# Patient Record
Sex: MALE | Race: WHITE
[De-identification: names, ages, dates, MRNs, and addresses within clinical notes are randomized per-mention and may not be internally consistent; named-entity substitution may affect disease eponyms.]

---

## 2020-08-29 ENCOUNTER — HOSPITAL ENCOUNTER (EMERGENCY)
Dept: HOSPITAL 97 - ER | Age: 30
Discharge: HOME | End: 2020-08-29
Payer: COMMERCIAL

## 2020-08-29 DIAGNOSIS — M54.5: Primary | ICD-10-CM

## 2020-08-29 PROCEDURE — 96372 THER/PROPH/DIAG INJ SC/IM: CPT

## 2020-08-29 PROCEDURE — 99283 EMERGENCY DEPT VISIT LOW MDM: CPT

## 2020-08-29 NOTE — XMS REPORT
Clinical Summary

                           Created on:2020



Patient:Dayo Vázquez

Sex:Male

:1990

External Reference #:[id number]





Demographics







                          Address                   [address] [place] [address]

 

                          Mobile Phone              [phone]

 

                          Home Phone                [phone]

 

                          Email Address             [email or web address]

 

                          Preferred Language        English

 

                          Marital Status            Unknown

 

                          Caodaism Affiliation     Unknown

 

                          Race                      White

 

                          Ethnic Group              Not  or 









Author







                          Organization              [facility]

 

                          Address                   [address] [place] [address]









Support







                Name            Relationship    Address         Phone

 

                MIKEL VÁZQUEZ   Unavailable     Unavailable     [phone]









Care Team Providers







                    Name                Role                Phone

 

                    Unavailable         Primary Care Provider Unavailable









Allergies

No Known Allergies



Medications







          Medication Sig       Dispensed Refills   Start Date End Date  Status

 

          traZODone (DESYREL) Take 100 mg           0                           

  Active



          100 MG tablet by mouth                                          



                    nightly.                                          

 

          cetirizine (ZYRTEC) Take 10 mg by           0                         

    Active



          10 MG tablet mouth daily.                                         

 

          omeprazole Take 20 mg by           0                             Activ

e



          (PRILOSEC) 20 MG mouth daily.                                         



          capsule                                                     

 

          clonazePAM Take 1 mg by           0                             Active



          (KLONOPIN) 1 MG mouth 2 (two)                                         



          tablet    times daily                                         



                    as needed for                                         



                    Anxiety.                                          

 

          tiZANidine Take 4 mg by           0                             Active



          (ZANAFLEX) 4 MG mouth every 6                                         



          tablet    (six) hours                                         



                    as needed.                                         

 

          multivitamin per Take 1 tablet           0                            

 Active



          tablet    by mouth                                          



                    daily.                                            

 

          cefadroxil Take 1 tablet 6 tablet  0         12/10/2019 2019 Exp

ired



          (DURICEF) 1 gram (1 g total)                                         



          tablet    by mouth 2                                         



                    (two) times                                         



                    daily for 3                                         



                    days.                                             

 

          docusate sodium Take 1    10 capsule 0         12/10/2019 12/10/2019 D

iscontinued



          (COLACE) 100 MG capsule (100                                         



          capsule   mg total) by                                         



                    mouth 2 (two)                                         



                    times daily                                         



                    for 10 days.                                         

 

          docusate sodium Take 1    10 capsule 0         12/10/2019 2019 E

xpired



          (COLACE) 100 MG capsule (100                                         



          capsule   mg total) by                                         



                    mouth 2 (two)                                         



                    times daily                                         



                    for 10 days.                                         







Active Problems







                          Problem                   Noted Date

 

                          Admission for reversal of vasectomy 12/10/2019







Encounters







             Date         Type         Specialty    Care Team    Description

 

             12/10/2019   Anesthesia Event              Meryl Ren MD     

 

             12/10/2019   Surgery                   Lipshultz, Jignesh EPIDIDYMOVA

LD PADILLA MD       

 

             12/10/2019   Hospital Encounter              Jignesh Sutton MD       

 

             2019   Hospital Encounter Pre-Admission              



                                       Testing                   



after 2019



Social History







             Tobacco Use  Types        Packs/Day    Years Used   Date

 

             Former Smoker              0.5          12           Quit: 2015









                Smokeless Tobacco: Never Used                                 









                Alcohol Use     Drinks/Week     oz/Week         Comments

 

                Yes                                             Occasionally









                          Sex Assigned at Birth     Date Recorded

 

                          Not on file               









                    Job Start Date      Occupation          Industry

 

                    Not on file         Not on file         Not on file









                    Travel History      Travel Start        Travel End









                                        No recent travel history available.







Last Filed Vital Signs







                    Vital Sign          Reading             Time Taken

 

                    Blood Pressure      144/86              12/10/2019  3:25 PM 

CST

 

                    Pulse               87                  12/10/2019  3:25 PM 

CST

 

                    Temperature         36.9 C (98.4 F) 12/10/2019  3:25 PM 

CST

 

                    Respiratory Rate    20                  12/10/2019  3:25 PM 

CST

 

                    Oxygen Saturation   97%                 12/10/2019  3:25 PM 

CST

 

                    Inhaled Oxygen Concentration -                   -

 

                    Weight              97.3 kg (214 lb 9.6 oz) 12/10/2019  7:15

 AM CST

 

                    Height              181.6 cm (5' 11.5") 12/10/2019  7:15 AM 

CST

 

                    Body Mass Index     29.51               12/10/2019  7:15 AM 

CST







Plan of Treatment

Not on file



Procedures







             Procedure Name Priority     Date/Time    Associated Diagnosis Comme

nts

 

             EPIDIDYMOVASOSTOMY              12/10/2019  9:30 AM CST Disorder of

 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 



                                                    organs, unspecified 



after 2019



Results

Not on fileafter 2019



Insurance







           Payer      Benefit Plan / Group Subscriber ID Type       Phone      A

ddress

 

           [facility] CHOICE CARD AND PC3 xxxxxxxxx                  

      









           Guarantor Name Account Type Relation to Date of    Phone      Billing



                                 Patient    Birth                 Address

 

           Dayo Vázquez Personal/Family Self       1990 [id number]                                     (Home)     [place], [place]



                                                                  [address]

## 2020-08-29 NOTE — XMS REPORT
Summary of Care

                            Created on:2020



Patient:Dayo Canas

Sex:Male

:1990

External Reference #:[id number]





Demographics







                          Address                   [address] [place] [address]

 

                          Home Phone                [phone]

 

                          Mobile Phone              [phone]

 

                          Phone                     [phone]

 

                          Email Address             [email or web address]

 

                          Preferred Language        English

 

                          Marital Status            

 

                          Yazidi Affiliation     Unknown

 

                          Race                      White

 

                          Ethnic Group              Not  or 









Author







                          Organization              [facility]

 

                          Address                   [address] [place] [address]









Care Team Providers







                    Name                Role                Phone

 

                    Unavailable         Primary Care Provider Unavailable









Reason for Visit







                          Reason                    Comments

 

                          Hypogonadism              



Consult, Test &amp; Treat (Routine)





           Status     Reason     Specialty  Diagnoses / Referred By Referred To



                                            Procedures Contact    Contact

 

                Authorization Not                 Urology         Diagnoses



Hypogonadism in male                    [facility]



                                        H[place],



                Needed                                          



Procedures



[place] OFFICE OUTPATIENT VISIT 15 MINUTES



ESTABLISHED OFFICE VISIT  [address] MD







                                                                 [facility] [address]



                                        [address] [place], [place] [address]







                                                                 [address]



                                        [place], [place]



                                                       Phone:     [phone]



                                        Phone:



                                                       Fax:       [phone] Fax:



                                                                  [phone]









Encounter Details







             Date         Type         Department   Care Team    Description

 

             2020   Office Visit [facility] Henry Sutton, 

Hypogonadism



                                                            Urology



                                        MD



                                        



                                                            [address], [facility] 

[address]



                                        [address] [place] [address]

[address]



                                        [address]



                                        



                                                [phone]    [place], [place] [address]

[address]



                                        



                                                                [phone] (Fax) 







Allergies

No Known Allergiesdocumented as of this encounter (statuses as of 2020)



Medications







          Medication Sig       Dispensed Refills   Start Date End Date  Status

 

          trazodone trazodone 100 mg tablet           0                         

    Active



          (DESYREL) 100  TAKE 1 TABLET BY MOUTH EVERY DAY AT BEDTIME            

                             



          MG tablet                                                   

 

          clonazepam TK 1 T PO BID           0         10/18/2019           Acti

ve



          (KLONOPIN) 1 MG PRN                                               



          tablet                                                      

 

          diazepam  diazepam 5 mg tablet           0                            

 Active



          (VALIUM) 5 MG  TAKE 1 TABLET BY MOUTH TWICE DAILY                     

                    



          tablet                                                      

 

          omeprazole TK 1 C PO QAM           3         2019           Acti

ve



          (PRILOSEC) 20                                                   



          MG capsule                                                   

 

          cetirizine,ZYRT TK 1 T PO QD           3         2019           

Active



          EC, 10 MG                                                   



          tablet                                                      

 

          Multiple  Take 1 Tab by           0                             Active



          Vitamin   mouth.                                            



          (MULTI-VITAMINS                                                   



          ) TABS                                                      

 

          docusate sodium  mg           0                             Act

jenelle



          (DOK) 100 MG capsule                                           



          capsule                                                     

 

          tizanidine tizanidine 4 mg tablet           0                         

    Active



          (ZANAFLEX) 4 MG  TAKE 1 TABLET BY MOUTH TWICE DAILY AS NEEDED         

                                



          tablet                                                      

 

          ketorolac ketorolac 10 mg tablet           0                          

   Active



                          (TORADOL) 10 MG            Take 1 tablet every 6 hours

 by oral route as needed for 30 

days.                                                            



          tablet                                                      

 

          diclofenac diclofenac           0                    Disconti

nued



          (VOLTAREN) 75 sodium 75 mg                               0         



          MG EC tablet tablet,delayed                                         



                    release                                           

 

          hydrocodone-ace hydrocodone 5           0                    

Discontinued



          taminophen mg-acetaminophen                               0         (*

Temporary



          (NORCO) 5-325 325 mg tablet                                         pr

escription)



          mg tablet                                                   

 

          celecoxib Take 1 Cap by 30 Cap    0         2020 Disco

ntinued



          (CELEBREX) 200 mouth daily.                               0         (*

Temporary



          MG capsule                                                   prescript

ion)

 

          diazepam  diazepam 5 mg           0                    Discon

tinued



          (VALIUM) 5 MG tablet                                  0         (*Dupl

icate



          tablet                                                      medication

)



documented as of this encounter (statuses as of 2020)



Active Problems







                          Problem                   Noted Date

 

                          Admission for reversal of vasectomy 12/10/2019

 

                          Sleep apnea               2018

 

                          Anxiety                   2018

 

                          Chronic back pain         2017

 

                          Gastroesophageal reflux disease 2017



documented as of this encounter (statuses as of 2020)



Social History







             Tobacco Use  Types        Packs/Day    Years Used   Date

 

             Never Smoker                                        









                Smokeless Tobacco: Never Used                                 









                          Sex Assigned at Birth     Date Recorded

 

                          Not on file               









                    Job Start Date      Occupation          Industry

 

                    Not on file         Not on file         Not on file









                    Travel History      Travel Start        Travel End









                                        No recent travel history available.









                    COVID-19 Exposure   Response            Date Recorded

 

                    In the last month, have you been in contact with No / Unsure

         2020  2:32 PM 

CDT



                    someone who was confirmed or suspected to have              

       



                    Coronavirus / COVID-19?                     



documented as of this encounter



Last Filed Vital Signs







                Vital Sign      Reading         Time Taken      Comments

 

                Blood Pressure  108/72          2020  3:20 PM CDT 

 

                Pulse           77              2020  3:20 PM CDT 

 

                Temperature     36.9 C (98.4 F) 2020  3:20 PM CDT 

 

                Respiratory Rate -               -               

 

                Oxygen Saturation -               -               

 

                Inhaled Oxygen Concentration -               -               

 

                Weight          97.5 kg (215 lb) 2020  3:20 PM CDT 

 

                Height          180.3 cm (5' 11") 2020  3:20 PM CDT 

 

                Body Mass Index 29.99           2020  3:20 PM CDT 



documented in this encounter



Progress Notes

Jignesh Sutton MD - 2020  2:30 PM CDTFormatting of this note might be
different from the original.

I saw and evaluated the patient and agree with the resident's/fellow's findings 
as written.



30 y.o. yo male patient returns for follow-up after vasectomy reversal



Mr. Dayo Cansa is a 31 yo male who is s/p B-VV, L-TESE on 12/10/2019.  He 
presented for post-op on2020 and was experiencing moderate edema in the 
scrotum for which I prescribed celebrex.  He reports that is now since resolved,
and he reports he is feeling good, no issues with swelling, discomfort only with
certain positions and reported as tolerable without pain medication.

Partner, wife, is 28 yo, they report they have been actively trying since 
cleared at post-



Component

    Latest Ref Rng &amp; Units 3/12/2020 2020

VOLUME

    ml 2 1.50

DENSITY

    60 - 120 M/ml 21.20 (A) 23.20 (A)

MOTILITY

    60 - 80 % 8 (A) 5 (A)

FORWARD PROGRESSION

    2.5 - 3.5 2 (A) 1.5 (A)

TOTAL COUNT

    90 - 600 M 42.40 (A) 34.80 (A)

TOTAL MOTILE

    54 - 480 M 3.39 (A) 1.74 (A)

CLUMPING

    None - 1+ 1 0



SA TD: 1.5 ml / 5.2 million / 4% / 1.5





Component

    Latest Ref Rng &amp; Units 12/10/2019

WEIGHT

    mg 190.0

COUNT/HPF:(AVG OF 10 FIELDS)

     7.00

MOTILITY

    % 1

FORWARD PROGRESSION

    0-4 0.0

COMMENT

     Freeze: LEFT TESE: 2 vials x 0.5 mL as per Dr. Ernesto CASAS Celebrex

RT 3/12

Discussed IVF

Electronically signed by Jignesh Sutton MD at 2020 10:09 AM CDT
Eladio Baker MD - 2020  2:30 PM CDTFormatting of this note might be 
different from the original.

Last seen 3/2020



Follow up Patient



No chief complaint on file.



HPI:  30 y.o. yo male patient returns for follow-up after vasectomy reversal



Mr. Dayo Canas is a 31 yo male who is s/p B-VV, L-TESE on 12/10/2019.  He 
presented for post-op on2020 and was experiencing moderate edema in the 
scrotum for which I prescribed celebrex.  He reports that is now since resolved,
and he reports he is feeling good, no issues with swelling, discomfort only with
certain positions and reported as tolerable without pain medication.

Partner, wife, is 28 yo, they report they have been actively trying since 
cleared at post-operative visit.

He presents today to review semen analysis results from May and this month



He has been doing celebrex for 6 months.



SA 20: 1.5 cc/ 5.2 M/mL/4%/7.8 M TC/ TMC 0.31 M/FP 1.5



Component

    Latest Ref Rng &amp; Units 2020

VOLUME

    ml 1.50

DENSITY

    60 - 120 M/ml 23.20 (A)

MOTILITY

    60 - 80 % 5 (A)

FORWARD PROGRESSION

    2.5 - 3.5 1.5 (A)

TOTAL COUNT

    90 - 600 M 34.80 (A)

TOTAL MOTILE

    54 - 480 M 1.74 (A)

CLUMPING

    None - 1+ 0

ROUNDCELLS

    &lt;7 /hpf 1-2

VISCOSITY EXT

    None 0-4+ 0

DAYS OF ABST B/F TO SEMN ANALYSIS

     2

COLLECTION TIME

     09:00am

TIME IN LAB

     09:13am

TIME EXAMINED

     09:45am



Review of Systems

Constitutional: Negative.

HENT: Negative.

Eyes: Negative.

Respiratory: Negative.

Cardiovascular: Negative.

Gastrointestinal: Negative.

Genitourinary:

     As per HPI

Musculoskeletal: Negative.

Skin: Negative.

Neurological: Negative.  Negative for dizziness.

Endo/Heme/Allergies: Negative.

Psychiatric/Behavioral: Negative.



Major illness, prior hospitalizations, surgical procedures and current 
medications were validated and updated appropriately.



/72 (BP Location: left arm, Patient Position: Sitting, Cuff Size: large)  
| Pulse 77  | Temp 98.4 F (36.9 C)  | Ht 5' 11" (1.803 m)  | Wt 215 lb (97.5
kg)  | BMI 29.99 kg/m

Physical Exam

Constitutional: He is oriented to person, place, and time. He appears well-
developed and well-nourished. He is cooperative. No distress.

HENT:

Head: Normocephalic and atraumatic. Hair is normal.

Right Ear: Hearing and external ear normal.

Left Ear: Hearing and external ear normal.

Nose: Nose normal.

Eyes: Conjunctivae, EOM and lids are normal. Right eye exhibits no discharge. 
Left eye exhibits no discharge. No scleral icterus.

Neck: Normal range of motion. Neck supple. No neck rigidity. No edema and no 
erythema present.

Pulmonary/Chest: Effort normal. No apnea, no tachypnea and no bradypnea. No 
respiratory distress.

Musculoskeletal: Normal range of motion. He exhibits no edema or deformity.

Neurological: He is alert and oriented to person, place, and time. Coordination 
and gait normal.

Skin: Skin is warm, dry and intact. No rash noted. He is not diaphoretic. No 
erythema. No pallor.

Psychiatric: He has a normal mood and affect. His behavior is normal. Judgment 
and thought content normal.



Assessment and Plan

Diagnoses and all orders for this visit:



Infertility, male, post-vasectomy reversal



Plan

- Stop celebrex

- Consider trial of Xseed

- follow-up with Dr. Sutton in 3 months



Eladio Baker MD

Urology PGY2



Electronically signed by Eladio Baker MD at 2020 10:09 AM CDT
documented in this encounter



Plan of Treatment







             Date         Type         Specialty    Care Team    Description

 

                2020      Office Visit    Urology         Jignesh Sutton MD



                                        



                                                                [address]



                                        



                                                                [address]



                                        



                                                                [place], [place] [address]

[address]



                                        



                                                                [phone] (Fax) 









                Health Maintenance Due Date        Last Done       Comments

 

                TETANUS SHOT (ADULT) 2003                      

 

                BMI FOLLOW UP PLAN 2006                      

 

                HIV SCREENING   2006                      

 

                FLU VACCINE > 6 MONTHS 2020                      



documented as of this encounter



Procedures







             Procedure Name Priority     Date/Time    Associated Diagnosis Comme

nts

 

             FSH + LH PROFILE Routine      2020  4:26 PM Testicular failur

e Results for this



                                       CDT                       procedure are i

n



                                                                 the results



                                                                 section.



documented in this encounter



Results

FSH + LH PROFILE (2020  4:26 PM CDT)





             Component    Value        Ref Range    Performed At Pathologist



                                                                 Signature

 

             FOLLICLE     5.3          1.5 - 12.4   CPL          



             STIMULATING HORMONE              IU/L                      

 

             LUTEINIZING HORMONE 7.4          1.2 - 8.6    CPL          



                          Comment:     IU/L                      



                                                                 



                              Unless Otherwise Indicated, All Testing Pe

rformed At:                           



                              [facility], [address] [place] [address]                           



                              : LIZBET Hutchinson                           



                              CLIA Number [id number] [place]

 [place]. [id number]                           



                                                                 









                                        Specimen

 

                                        









                Performing Organization Address         City/[facility] Phone

 Number

 

                [address] [address] [phone]



TESTOSTERONE-TOTAL(URO DEPT) (2020)





             Component    Value        Ref Range    Performed At Pathologist Sig

nature

 

             TESTOSTERONE LEVEL 322          300 - 1,000 ng/dl [facility]          

 

             LABORATORY FOR MALE                           CPL          



             REPRODUCTIVE RESEARCH &                                        



             TESTING                                             

 

             CLIA# [id number]                           [facility]          

 

             DIRECTOR:GARY GOODEN,PH.D.,HCLD                                        









                                        Specimen

 

                                        Serum









                Performing Organization Address         City[facility] Phone

 Number

 

                [address] [address] [phone]



documented in this encounter



Visit Diagnoses







                                        Diagnosis

 

                                        Testicular failure - Primary







                                        Other testicular hypofunction



documented in this encounter



Insurance







          Payer     Benefit Plan Subscriber ID Effective Phone     Address   Typ

e



                    / Group             Dates                         

 

          BLUE CROSS BLUE OUT OF STATE xxxxxxxxxxxx 2019-Prese           PO 

BOX    PPO



           SHIELD     BCBS - PPO -            [id number]                    [id number]



                                        



                    [place] 



                                                            [address] 

 

           [employer]    [employer] xxxxxxxxx  Effective for [id number] [address] 

[address]



                                        Other



          HEALTHCARE                     all dates [address]        [facility]                                          [address] 









           Guarantor Name Account Type Relation to Date of Birth Phone      Bill

ing



                                 Patient                          Address

 

           Dayo Canas Personal/Family Self       1990 [phone] [address]



                                                       (Home)     [place], [place]



                                                                  [address]



documented as of this encounter

## 2020-08-29 NOTE — ER
Nurse's Notes                                                                                     

 [facility]                                                                 

Name: Dayo Canas                                                                                

Age: 30 yrs                                                                                       

Sex: Male                                                                                         

: 1990                                                                                   

MRN: [id number]                                                                                   

Arrival Date: 2020                                                                          

Time: 11:55                                                                                       

Account#: [id number]                                                                            

Bed 7                                                                                             

Private MD:                                                                                       

Diagnosis: Low back pain                                                                          

                                                                                                  

Presentation:                                                                                     

                                                                                             

12:02 Chief complaint: Patient states: hx of herniated disc, injured it on Thursday while     iw  

      mowing and fixing a sprinkler, now has low back pain L5 area, feels like pinched nerve      

      , takes ketorolac for pain, not helping. Coronavirus screen: At this time, the client       

      does not indicate any symptoms associated with coronavirus-19. Ebola Screen: Patient        

      negative for fever greater than or equal to 101.5 degrees Fahrenheit, and additional        

      compatible Ebola Virus Disease symptoms Patient denies exposure to infectious person.       

      Patient denies travel to an Ebola-affected area in the 21 days before illness onset. No     

      symptoms or risks identified at this time. Initial Sepsis Screen: Does the patient meet     

      any 2 criteria? No. Patient's initial sepsis screen is negative. Does the patient have      

      a suspected source of infection? No. Patient's initial sepsis screen is negative. Risk      

      Assessment: Do you want to hurt yourself or someone else? Patient reports no desire to      

      harm self or others. Onset of symptoms was 2020.                                 

12:02 Method Of Arrival: Ambulatory                                                           iw  

12:02 Acuity: ANNETTE 3                                                                           iw  

                                                                                                  

Historical:                                                                                       

- Allergies:                                                                                      

12:04 No Known Allergies;                                                                     iw  

- PMHx:                                                                                           

12:04 Back pain;                                                                              iw  

- PSHx:                                                                                           

12:04 Knee surgery;                                                                           iw  

                                                                                                  

- Immunization history:: Adult Immunizations not up to date.                                      

- Social history:: Smoking status: Patient denies any tobacco usage or history of.                

- Family history:: not pertinent.                                                                 

                                                                                                  

                                                                                                  

Screenin:10 Abuse screen: Denies threats or abuse. Denies injuries from another. Nutritional        sv  

      screening: No deficits noted. Tuberculosis screening: No symptoms or risk factors           

      identified. Fall Risk None identified.                                                      

                                                                                                  

Assessment:                                                                                       

12:20 General: Appears in no apparent distress. uncomfortable, Behavior is calm, cooperative. hb  

      Pain: Pain currently is 6 out of 10 on a pain scale. Neuro: Level of Consciousness is       

      awake, alert, obeys commands, Oriented to person, place, time, situation.                   

      Cardiovascular: Capillary refill < 3 seconds Patient's skin is warm and dry.                

      Respiratory: Respiratory effort is even, unlabored, Respiratory pattern is regular,         

      symmetrical. GI: No signs and/or symptoms were reported involving the gastrointestinal      

      system. : No signs and/or symptoms were reported regarding the genitourinary system.      

      EENT: No signs and/or symptoms were reported regarding the EENT system. Derm: Skin is       

      pink, warm \T\ dry. Derm: Reports low back pain.                                            

12:47 Reassessment: Pt waiting IM shot time before discharge.                                 sv  

13:05 Reassessment: Patient appears in no apparent distress at this time. No changes from     sv  

      previously documented assessment. Patient and/or family updated on plan of care and         

      expected duration. Pain level reassessed. Patient is alert, oriented x 3, equal             

      unlabored respirations, skin warm/dry/pink.                                                 

                                                                                                  

Vital Signs:                                                                                      

12:02  / 90; Pulse 64; Resp 16; Temp 98.7; Pulse Ox 99% on R/A; Weight 99.79 kg; Height iw  

      5 ft. 11 in. (180.34 cm); Pain 6/10;                                                        

12:02 Body Mass Index 30.68 (99.79 kg, 180.34 cm)                                             iw  

                                                                                                  

ED Course:                                                                                        

11:55 Patient arrived in ED.                                                                  ds1 

12:04 Triage completed.                                                                       iw  

12:10 Ragini Hewitt, RN is Primary Nurse.                                                  sv  

12:10 Piyush Alberts MD is Attending Physician.                                           ma2 

12:10 Arm band placed on.                                                                     sv  

12:10 Patient has correct armband on for positive identification. Bed in low position. Call   sv  

      light in reach. Door closed. Head of bed elevated.                                          

12:29 ED physician to see patient.                                                            sv  

13:05 No provider procedures requiring assistance completed. Patient did not have IV access   sv  

      during this emergency room visit.                                                           

                                                                                                  

Administered Medications:                                                                         

12:44 Drug: MethylPREDNISolone Sodium Succinate 125 mg Route: IM; Site: left gluteus;         sv  

13:04 Follow up: Response: No adverse reaction                                                sv  

12:44 Drug: TORadol 60 mg Route: IM; Site: left gluteus;                                      sv  

13:04 Follow up: Response: No adverse reaction                                                sv  

                                                                                                  

                                                                                                  

Outcome:                                                                                          

12:41 Discharge ordered by MD.                                                                ma2 

13:05 Discharged to home ambulatory.                                                          sv  

13:05 Condition: stable                                                                           

13:05 Discharge instructions given to patient, Instructed on discharge instructions, follow       

      up and referral plans. medication usage, Demonstrated understanding of instructions,        

      follow-up care, medications, Prescriptions given X 2.                                       

13:06 Patient left the ED.                                                                    sv  

                                                                                                  

Signatures:                                                                                       

Ragini Hewitt RN RN sv Sanford, Demi                                ds1                                                  

Gabriela Ryan RN RN                                                      

Chelsi Prince RN RN hb Alzahri, Mohammad, MD MD   ma2                                                  

                                                                                                  

**************************************************************************************************

## 2020-08-29 NOTE — EDPHYS
Physician Documentation                                                                           

 [facility]                                                                 

Name: Dayo Canas                                                                                

Age: 30 yrs                                                                                       

Sex: Male                                                                                         

: 1990                                                                                   

MRN: [id number]                                                                                   

Arrival Date: 2020                                                                          

Time: 11:55                                                                                       

Account#: [id number]                                                                            

Bed 7                                                                                             

Private MD:                                                                                       

ED Physician Piyush Alberts                                                                    

HPI:                                                                                              

                                                                                             

12:38 This 30 yrs old  Male presents to ER via Ambulatory with complaints of Back    ma2 

      Pain.                                                                                       

12:38 The patient presents with pain that is chronic. Onset: The symptoms/episode             ma2 

      began/occurred gradually, 3 year(s) ago. Associated signs and symptoms: Pertinent           

      negatives: chest pain, fever, hematuria, nausea. The problem was sustained when lifting     

      from twisting. Severity of symptoms: At their worst the symptoms were moderate, in the      

      emergency department the symptoms are unchanged. The patient has experienced similar        

      episodes in the past. has chronic back pain was working in his yard last week and           

      lifting heavy objects and back pain got worse, no trauma, no fever, weakness or problem     

      with urination.                                                                             

                                                                                                  

Historical:                                                                                       

- Allergies:                                                                                      

12:04 No Known Allergies;                                                                     iw  

- PMHx:                                                                                           

12:04 Back pain;                                                                              iw  

- PSHx:                                                                                           

12:04 Knee surgery;                                                                           iw  

                                                                                                  

- Immunization history:: Adult Immunizations not up to date.                                      

- Social history:: Smoking status: Patient denies any tobacco usage or history of.                

- Family history:: not pertinent.                                                                 

                                                                                                  

                                                                                                  

ROS:                                                                                              

12:38 Constitutional: Negative for fever, chills, and weight loss.                            ma2 

12:38 All other systems are negative.                                                             

                                                                                                  

Exam:                                                                                             

12:38 Constitutional:  This is a well developed, well nourished patient who is awake, alert,  ma2 

      and in no acute distress. ENT:  Nares patent. No nasal discharge, no septal                 

      abnormalities noted.  Tympanic membranes are normal and external auditory canals are        

      clear.  Oropharynx with no redness, swelling, or masses, exudates, or evidence of           

      obstruction, uvula midline.  Mucous membranes moist. Neck:  Trachea midline, no             

      thyromegaly or masses palpated, and no cervical lymphadenopathy.  Supple, full range of     

      motion without nuchal rigidity, or vertebral point tenderness.  No Meningismus.             

      Chest/axilla:  Normal chest wall appearance and motion.  Nontender with no deformity.       

      No lesions are appreciated. Cardiovascular:  Regular rate and rhythm with a normal S1       

      and S2.  No gallops, murmurs, or rubs.  Normal PMI, no JVD.  No pulse deficits.             

      Respiratory:  Lungs have equal breath sounds bilaterally, clear to auscultation and         

      percussion.  No rales, rhonchi or wheezes noted.  No increased work of breathing, no        

      retractions or nasal flaring. Abdomen/GI:  Soft, non-tender, with normal bowel sounds.      

      No distension or tympany.  No guarding or rebound.  No evidence of tenderness               

      throughout. Back:  No spinal tenderness.  No costovertebral tenderness.  Full range of      

      motion. Skin:  Warm, dry with normal turgor.  Normal color with no rashes, no lesions,      

      and no evidence of cellulitis. MS/ Extremity:  Pulses equal, no cyanosis.                   

      Neurovascular intact.  Full, normal range of motion. Neuro:  Awake and alert, GCS 15,       

      oriented to person, place, time, and situation.  Cranial nerves II-XII grossly intact.      

      Motor strength 5/5 in all extremities.  Sensory grossly intact.  Cerebellar exam            

      normal.  Normal gait.                                                                       

                                                                                                  

Vital Signs:                                                                                      

12:02  / 90; Pulse 64; Resp 16; Temp 98.7; Pulse Ox 99% on R/A; Weight 99.79 kg; Height iw  

      5 ft. 11 in. (180.34 cm); Pain 6/10;                                                        

12:02 Body Mass Index 30.68 (99.79 kg, 180.34 cm)                                             iw  

                                                                                                  

MDM:                                                                                              

12:10 Patient medically screened.                                                             ma2 

12:38 Differential diagnosis: arthritis, chronic back pain, Fatigue sprain. Data reviewed:    [facility] 

      vital signs, nurses notes. Counseling: I had a detailed discussion with the patient         

      and/or guardian regarding: the historical points, exam findings, and any diagnostic         

      results supporting the discharge/admit diagnosis, the presence of at least one elevated     

      blood pressure reading (>120/80) during this emergency department visit, the need for       

      outpatient follow up. Response to treatment: There is no appreciated change of the          

      patient's symptoms at this time. Response to treatment: the patient's symptoms have         

      markedly improved after treatment. ED course: no red flags for cauda equina or disc         

      compressoini .                                                                              

                                                                                                  

Administered Medications:                                                                         

12:44 Drug: MethylPREDNISolone Sodium Succinate 125 mg Route: IM; Site: left gluteus;         sv  

13:04 Follow up: Response: No adverse reaction                                                sv  

12:44 Drug: TORadol 60 mg Route: IM; Site: left gluteus;                                      sv  

13:04 Follow up: Response: No adverse reaction                                                sv  

                                                                                                  

                                                                                                  

Disposition:                                                                                      

20 12:41 Discharged to Home. Impression: Low back pain.                                     

- Condition is Stable.                                                                            

- Discharge Instructions: Back Pain, Adult, Back Pain, Adult, Easy-to-Read.                       

- Prescriptions for Diclofenac Sodium 75 mg Oral Tablet Sustained Release - take 1                

  tablet by ORAL route 2 times per day; 30 tablet. Medrol (Abdirashid) 4 mg Oral Tablets, Dose           

  Pack - take 1 tablet by ORAL route as directed - follow package instructions; 1                 

  packet.                                                                                         

- Work release form, Medication Reconciliation Form, Thank You Letter, Antibiotic                 

  Education, Prescription Opioid Use form.                                                        

- Follow up: Private Physician; When: Tomorrow; Reason: Continuance of care.                      

                                                                                                  

                                                                                                  

                                                                                                  

Signatures:                                                                                       

Ragini Hewitt RN RN sv Williams, Irene, RN RN iw Alzahri, Mohammad, MD MD   ma2                                                  

                                                                                                  

Corrections: (The following items were deleted from the chart)                                    

13:06 12:41 2020 12:41 Discharged to Home. Impression: Low back pain. Condition is      sv  

      Stable. Discharge Instructions: Back Pain, Adult, Easy-to-Read. Prescriptions for           

      Diclofenac Sodium 75 mg Oral Tablet Sustained Release - take 1 tablet by ORAL route 2       

      times per day; 30 tablet, Medrol (Abdirashid) 4 mg Oral Tablets, Dose Pack - take 1 tablet by      

      ORAL route as directed - follow package instructions; 1 packet. and Forms are               

      Medication Reconciliation Form, Thank You Letter, Antibiotic Education, Prescription        

      Opioid Use. Follow up: Private Physician; When: Tomorrow; Reason: Continuance of care.      

      ma2                                                                                         

                                                                                                  

**************************************************************************************************